# Patient Record
Sex: FEMALE
[De-identification: names, ages, dates, MRNs, and addresses within clinical notes are randomized per-mention and may not be internally consistent; named-entity substitution may affect disease eponyms.]

---

## 2020-04-23 ENCOUNTER — TRANSCRIPTION ENCOUNTER (OUTPATIENT)
Age: 85
End: 2020-04-23

## 2022-03-14 ENCOUNTER — TRANSCRIPTION ENCOUNTER (OUTPATIENT)
Age: 87
End: 2022-03-14

## 2022-04-22 ENCOUNTER — APPOINTMENT (OUTPATIENT)
Dept: ORTHOPEDIC SURGERY | Facility: CLINIC | Age: 87
End: 2022-04-22
Payer: MEDICARE

## 2022-04-22 VITALS — HEIGHT: 63 IN | BODY MASS INDEX: 25.69 KG/M2 | WEIGHT: 145 LBS

## 2022-04-22 DIAGNOSIS — M19.011 PRIMARY OSTEOARTHRITIS, RIGHT SHOULDER: ICD-10-CM

## 2022-04-22 DIAGNOSIS — Z86.39 PERSONAL HISTORY OF OTHER ENDOCRINE, NUTRITIONAL AND METABOLIC DISEASE: ICD-10-CM

## 2022-04-22 DIAGNOSIS — Z78.9 OTHER SPECIFIED HEALTH STATUS: ICD-10-CM

## 2022-04-22 PROCEDURE — 99213 OFFICE O/P EST LOW 20 MIN: CPT

## 2022-04-22 NOTE — ASSESSMENT
[FreeTextEntry1] : R medial clavicle fx, healed.\par R GH DJD. \par  R GH injection given 9/22/21 with minimal relief.\par  R shoulder orthovisc without relief. \par  Acupuncture RX given. \par RTO prn.

## 2022-04-22 NOTE — HISTORY OF PRESENT ILLNESS
[5] : 5 [Dull/Aching] : dull/aching [Radiating] : radiating [Retired] : Work status: retired [de-identified] : 4/22/22: Follow up R shoulder. Pain persists. Orthovisc in February with minimal relief. \par \par 2/23/22: Here for R shoulder orthovisc #4\par \par 2/16/22: Here for R shoulder orthovisc #3.\par \par 2/9/22: R shoulder orthovisc #2.\par \par 2/2/22: Here for follow up. Her shoulder pain has continued, worse with activity, overhead motion. The cortisone only gives minimal relief.\par \par 9/22/21: Here for follow up. Her shoulder pain has returned. She feels pain worse with lifting and with IR.\par \par 3/3/21: Follow up R clavicle and shoulder. Injection in Nov 2020 with some relief. She still does have pain in the upper arm with activities.\par \par 11/25/20: Follow up right clavicle. She is doing well. Using her arm at home. She states there is pain in her shoulder.\par \par 10/28/20: Follow up R clavicle. She still has pain but less. She started using her arm.\par \par 10/7/20: Here for CT scan review. \par \par CT R clavicle: sternal end clavicle fracture, mild displacement\par \par 9/30/20: 83 yo RHD female with right shoulder and chest pain since 9/29/20. She reports falling off a chair onto her right side. There is swelling and bruising. She is known to have shoulder OA [] : Post Surgical Visit: no [FreeTextEntry1] : right shoulder [FreeTextEntry7] : shoulder to elbow [FreeTextEntry8] : range of motion [de-identified] : gel injection

## 2022-04-22 NOTE — PHYSICAL EXAM
[Right] : right shoulder [] : pain with external rotation [Moderate] : moderate [4 ___] : forward flexion 4[unfilled]/5 [4___] : abduction 4[unfilled]/5 [FreeTextEntry8] : n [FreeTextEntry9] : FE: 90\par ER: 20\par IR: PLB

## 2022-09-20 ENCOUNTER — NON-APPOINTMENT (OUTPATIENT)
Age: 87
End: 2022-09-20

## 2023-03-13 ENCOUNTER — APPOINTMENT (OUTPATIENT)
Dept: ORTHOPEDIC SURGERY | Facility: CLINIC | Age: 88
End: 2023-03-13
Payer: MEDICARE

## 2023-03-13 DIAGNOSIS — M65.9 SYNOVITIS AND TENOSYNOVITIS, UNSPECIFIED: ICD-10-CM

## 2023-03-13 DIAGNOSIS — Z00.00 ENCOUNTER FOR GENERAL ADULT MEDICAL EXAMINATION W/OUT ABNORMAL FINDINGS: ICD-10-CM

## 2023-03-13 PROCEDURE — 73610 X-RAY EXAM OF ANKLE: CPT | Mod: LT

## 2023-03-13 PROCEDURE — 99214 OFFICE O/P EST MOD 30 MIN: CPT

## 2023-03-13 NOTE — PHYSICAL EXAM
[NL (40)] : plantar flexion 40 degrees [NL 30)] : inversion 30 degrees [NL (20)] : eversion 20 degrees [5___] : [facility] 5[unfilled]/5 [2+] : posterior tibialis pulse: 2+ [Normal] : saphenous nerve sensation normal [Left] : left ankle [Weight -] : weightbearing [There are no fractures, subluxations or dislocations. No significant abnormalities are seen] : There are no fractures, subluxations or dislocations. No significant abnormalities are seen [] : non-antalgic

## 2023-03-13 NOTE — HISTORY OF PRESENT ILLNESS
[de-identified] : Pt is a 87 year old female who presents today for evaluation of her left ankle. Pt states that she goes to the gym 3x/wk and her ankle started to feel crepitus with pain and could only alleviate by consciously walking heel to toe for a bit. Pain localized along the medial ankle. Denies trauma/previous injury. No numbness/tingling. No formal treatment to date. WB in sneakers. She has had similar symptoms in the past.  She has no pain today.  She is able to wb without assistance. [] : Post Surgical Visit: no [FreeTextEntry1] : L ankle

## 2023-03-13 NOTE — ASSESSMENT
[FreeTextEntry1] : Patient has no findings at this time.\par She liklely had a flare on inflammation.\par Supportive shoes recommended.\par nsaids prn\par No other treatment needed at this time.

## 2023-04-10 ENCOUNTER — NON-APPOINTMENT (OUTPATIENT)
Age: 88
End: 2023-04-10

## 2023-05-17 ENCOUNTER — APPOINTMENT (OUTPATIENT)
Dept: ORTHOPEDIC SURGERY | Facility: CLINIC | Age: 88
End: 2023-05-17
Payer: MEDICARE

## 2023-05-17 VITALS — BODY MASS INDEX: 25.69 KG/M2 | HEIGHT: 63 IN | WEIGHT: 145 LBS

## 2023-05-17 DIAGNOSIS — M19.012 PRIMARY OSTEOARTHRITIS, LEFT SHOULDER: ICD-10-CM

## 2023-05-17 PROCEDURE — 73030 X-RAY EXAM OF SHOULDER: CPT | Mod: LT

## 2023-05-17 PROCEDURE — 73010 X-RAY EXAM OF SHOULDER BLADE: CPT | Mod: LT

## 2023-05-17 PROCEDURE — 99214 OFFICE O/P EST MOD 30 MIN: CPT

## 2023-05-17 NOTE — PHYSICAL EXAM
[Left] : left shoulder [5 ___] : forward flexion 5[unfilled]/5 [5___] : internal rotation 5[unfilled]/5 [] : pain with strength testing [FreeTextEntry9] : \par ER 30

## 2023-05-17 NOTE — HISTORY OF PRESENT ILLNESS
[Gradual] : gradual [4] : 4 [0] : 0 [Dull/Aching] : dull/aching [Household chores] : household chores [Leisure] : leisure [Sleep] : sleep [Nothing helps with pain getting better] : Nothing helps with pain getting better [Retired] : Work status: retired [de-identified] : 5/17/23: Here for left shoulder pain since 5/3/23. Denies specific injury. She has pain at night. She is LHD and has pain with reaching and lifting. Patient states she took naproxen with mild relief. \par \par 4/22/22: Follow up R shoulder. Pain persists. Orthovisc in February with minimal relief. \par \par 2/23/22: Here for R shoulder orthovisc #4\par \par 2/16/22: Here for R shoulder orthovisc #3.\par \par 2/9/22: R shoulder orthovisc #2.\par \par 2/2/22: Here for follow up. Her shoulder pain has continued, worse with activity, overhead motion. The cortisone only gives minimal relief.\par \par 9/22/21: Here for follow up. Her shoulder pain has returned. She feels pain worse with lifting and with IR.\par \par 3/3/21: Follow up R clavicle and shoulder. Injection in Nov 2020 with some relief. She still does have pain in the upper arm with activities.\par \par 11/25/20: Follow up right clavicle. She is doing well. Using her arm at home. She states there is pain in her shoulder.\par \par 10/28/20: Follow up R clavicle. She still has pain but less. She started using her arm.\par \par 10/7/20: Here for CT scan review. \par \par CT R clavicle: sternal end clavicle fracture, mild displacement\par \par 9/30/20: 83 yo RHD female with right shoulder and chest pain since 9/29/20. She reports falling off a chair onto her right side. There is swelling and bruising. She is known to have shoulder OA [] : Post Surgical Visit: no [FreeTextEntry1] : left shoulder [FreeTextEntry5] : pt started to feel pain and discomfort in the left shoulder a few weeks ago with no apparent injury [FreeTextEntry7] : left arm [de-identified] : motion/pressure

## 2023-05-17 NOTE — ASSESSMENT
[FreeTextEntry1] : R GH DJD, now L GH DJD. \par Xrays and advanced imaging reviewed. \par Discussed op versus non op tx, including the r/b/a/c of both.\par Discussed timing, frequency and efficacy of cortisone injections.\par Discussed risks of repeated cortisone injections. \par Discussed trial of visco supplementation.\par Continue naproxen PRN. \par RTO PRN. \par

## 2023-08-15 ENCOUNTER — NON-APPOINTMENT (OUTPATIENT)
Age: 88
End: 2023-08-15

## 2024-04-12 ENCOUNTER — APPOINTMENT (OUTPATIENT)
Dept: ORTHOPEDIC SURGERY | Facility: CLINIC | Age: 89
End: 2024-04-12
Payer: MEDICARE

## 2024-04-12 VITALS — BODY MASS INDEX: 25.69 KG/M2 | WEIGHT: 145 LBS | HEIGHT: 63 IN

## 2024-04-12 DIAGNOSIS — M18.11 UNILATERAL PRIMARY OSTEOARTHRITIS OF FIRST CARPOMETACARPAL JOINT, RIGHT HAND: ICD-10-CM

## 2024-04-12 PROCEDURE — 20606 DRAIN/INJ JOINT/BURSA W/US: CPT | Mod: RT

## 2024-04-12 PROCEDURE — 73130 X-RAY EXAM OF HAND: CPT | Mod: RT

## 2024-04-12 PROCEDURE — 99213 OFFICE O/P EST LOW 20 MIN: CPT | Mod: 25

## 2024-04-12 NOTE — PHYSICAL EXAM
[1st] : 1st [CMC Joint] : CMC joint [Right] : right hand [Degenerative change] : Degenerative change

## 2024-04-21 ENCOUNTER — NON-APPOINTMENT (OUTPATIENT)
Age: 89
End: 2024-04-21

## 2024-05-06 NOTE — PROCEDURE
[FreeTextEntry3] : Procedure Name: r cnc1 Injection: Celestone, Lidocaine, Marcaine, Evaluation and Guidance Ultrasound Injection was performed because of pain and inflammation. Anesthesia: ethyl chloride sprayed topically.  Celestone: 2 cc.  Lidocaine: 2 cc.  Marcaine: 2 cc.  Medication was injected. Patient has tried OTC's including aspirin, Ibuprofen, Aleve etc or prescription NSAIDS, and/or exercises at home and/ or physical therapy without satisfactory response. After verbal consent using sterile preparation and technique. The risks, benefits, and alternatives to cortisone injection were explained in full to the patient. Risks outlined include but are not limited to infection, sepsis, bleeding, scarring, skin discoloration, temporary increase in pain, syncopal episode, failure to resolve symptoms, allergic reaction, symptom recurrence, and elevation of blood sugar in diabetics. Patient understood the risks. All questions were answered. After discussion of options, patient requested an injection. Oral informed consent was obtained and sterile prep was done of the injection site. Sterile technique was utilized for the procedure including the preparation of the solutions used for the injection. Patient tolerated the procedure well. Advised to ice the injection site this evening. Prep with betadine locally to site. Sterile technique used.  Ultrasound guided injection was performed. Visualization of the needle and placement of injection was performed without complication.

## 2024-05-06 NOTE — HISTORY OF PRESENT ILLNESS
[de-identified] : Right hand/wrist pain [FreeTextEntry1] : hand/wrist [FreeTextEntry5] : 4/12 pain r hand with gripping

## 2024-05-14 ENCOUNTER — APPOINTMENT (OUTPATIENT)
Dept: ORTHOPEDIC SURGERY | Facility: CLINIC | Age: 89
End: 2024-05-14

## 2025-02-03 ENCOUNTER — NON-APPOINTMENT (OUTPATIENT)
Age: 89
End: 2025-02-03

## 2025-06-06 ENCOUNTER — APPOINTMENT (OUTPATIENT)
Dept: ORTHOPEDIC SURGERY | Facility: CLINIC | Age: 89
End: 2025-06-06
Payer: MEDICARE

## 2025-06-06 PROCEDURE — 73503 X-RAY EXAM HIP UNI 4/> VIEWS: CPT | Mod: LT

## 2025-06-06 PROCEDURE — 99214 OFFICE O/P EST MOD 30 MIN: CPT
